# Patient Record
Sex: FEMALE | Race: WHITE
[De-identification: names, ages, dates, MRNs, and addresses within clinical notes are randomized per-mention and may not be internally consistent; named-entity substitution may affect disease eponyms.]

---

## 2020-01-02 PROBLEM — Z00.00 ENCOUNTER FOR PREVENTIVE HEALTH EXAMINATION: Status: ACTIVE | Noted: 2020-01-02

## 2020-01-03 ENCOUNTER — APPOINTMENT (OUTPATIENT)
Dept: ORTHOPEDIC SURGERY | Facility: CLINIC | Age: 75
End: 2020-01-03
Payer: MEDICARE

## 2020-01-03 VITALS — HEIGHT: 63 IN | WEIGHT: 165 LBS | BODY MASS INDEX: 29.23 KG/M2

## 2020-01-03 DIAGNOSIS — M25.562 PAIN IN LEFT KNEE: ICD-10-CM

## 2020-01-03 DIAGNOSIS — Z78.9 OTHER SPECIFIED HEALTH STATUS: ICD-10-CM

## 2020-01-03 PROCEDURE — 73562 X-RAY EXAM OF KNEE 3: CPT | Mod: LT

## 2020-01-03 PROCEDURE — 99215 OFFICE O/P EST HI 40 MIN: CPT

## 2020-01-03 NOTE — PHYSICAL EXAM
[de-identified] : General: No acute distress, conversant, well-nourished.\par Head: Normocephalic, atraumatic\par Neck: trachea midline, FROM\par Heart: normotensive and normal rate and rhythm\par Lungs: No labored breathing\par Skin: No abrasions, no rashes, no edema\par Psych: Alert and oriented to person, place and time\par Extremities: no peripheral edema or digital cyanosis\par Gait: Normal gait. Can perform tandem gait.  \par Vascular: warm and well perfused distally, palpable distal pulses\par \par Left knee with no erythema, no effusion.  \par No tenderness to palpation of knee.\par No medial joint line tenderness.\par No lateral joint line tenderness.\par \par Patient reluctant to move knee but passive range of motion: 0 - 120 degrees\par No pain with range of motion.\par \par Negative Alejo's test.\par Stable to varus and valgus stress.\par Negative Lachman's test, negative anterior and posterior drawer tests.  \par \par Sensation intact to light touch.  \par Normal motor exam.\par Warm and well perfused distally.\par  [de-identified] : Left knee radiographs obtained in the office today shows no acute fracture or dislocation.  Mild age-related degenerative changes most pronounced in medial compartment.  \par

## 2020-01-03 NOTE — ASSESSMENT
[FreeTextEntry1] : 74 year old female with left knee pain after rolling over in bed.  The pain has been improved but the patient remains concerned it will reoccur.  She declined a physical therapy referral.  She says she currently has no pain so declines anti-inflammatory medications.  She will do home exercises and start advancing her activities as tolerated.  She will followup in 1 month.  She knows to call with any questions or concerns or if her symptoms acutely worsen.

## 2020-01-03 NOTE — HISTORY OF PRESENT ILLNESS
[de-identified] : 74 year old female with left knee pain that started on 12/13/19.  She was in bed when she rolled over and heard a pop in her left knee and had pain.  She says she is concerned the pain will reoccur so she has been reluctant to move the knee.  She said she did take some Advil for the pain which helped but she currently has no pain and would like to avoid medications.

## 2020-02-04 ENCOUNTER — APPOINTMENT (OUTPATIENT)
Dept: ORTHOPEDIC SURGERY | Facility: CLINIC | Age: 75
End: 2020-02-04